# Patient Record
Sex: FEMALE | Race: WHITE | Employment: PART TIME | URBAN - METROPOLITAN AREA
[De-identification: names, ages, dates, MRNs, and addresses within clinical notes are randomized per-mention and may not be internally consistent; named-entity substitution may affect disease eponyms.]

---

## 2022-03-18 ENCOUNTER — APPOINTMENT (OUTPATIENT)
Dept: GENERAL RADIOLOGY | Age: 37
End: 2022-03-18
Attending: NURSE PRACTITIONER
Payer: OTHER GOVERNMENT

## 2022-03-18 ENCOUNTER — HOSPITAL ENCOUNTER (EMERGENCY)
Age: 37
Discharge: HOME | End: 2022-03-18
Attending: EMERGENCY MEDICINE
Payer: OTHER GOVERNMENT

## 2022-03-18 VITALS
DIASTOLIC BLOOD PRESSURE: 77 MMHG | WEIGHT: 160 LBS | TEMPERATURE: 98.8 F | SYSTOLIC BLOOD PRESSURE: 125 MMHG | RESPIRATION RATE: 18 BRPM | HEIGHT: 64 IN | HEART RATE: 76 BPM | OXYGEN SATURATION: 100 % | BODY MASS INDEX: 27.31 KG/M2

## 2022-03-18 DIAGNOSIS — S70.11XA HEMATOMA OF RIGHT THIGH, INITIAL ENCOUNTER: Primary | ICD-10-CM
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
## 2022-03-18 PROCEDURE — 73552 X-RAY EXAM OF FEMUR 2/>: CPT

## 2022-03-18 PROCEDURE — 73502 X-RAY EXAM HIP UNI 2-3 VIEWS: CPT

## 2022-03-18 PROCEDURE — 99283 EMERGENCY DEPT VISIT LOW MDM: CPT

## 2022-03-18 PROCEDURE — 74011250637 HC RX REV CODE- 250/637: Performed by: NURSE PRACTITIONER

## 2022-03-18 RX ORDER — NAPROXEN 500 MG/1
500 TABLET ORAL
Qty: 20 TABLET | Refills: 0 | Status: SHIPPED | OUTPATIENT
Start: 2022-03-18 | End: 2022-03-28

## 2022-03-18 RX ORDER — HYDROCODONE BITARTRATE AND ACETAMINOPHEN 5; 325 MG/1; MG/1
1 TABLET ORAL
Status: COMPLETED | OUTPATIENT
Start: 2022-03-18 | End: 2022-03-18

## 2022-03-18 RX ORDER — METHOCARBAMOL 750 MG/1
1500 TABLET, FILM COATED ORAL
Qty: 30 TABLET | Refills: 0 | Status: SHIPPED | OUTPATIENT
Start: 2022-03-18

## 2022-03-18 RX ADMIN — HYDROCODONE BITARTRATE AND ACETAMINOPHEN 1 TABLET: 5; 325 TABLET ORAL at 14:53

## 2022-03-18 NOTE — Clinical Note
Anila Baker was seen and treated in our emergency department on 3/18/2022. Please excuse patient's , Barbara Mcknight as he accompanied her in the ER after a traumatic injury.      CHATA Timmons

## 2022-03-18 NOTE — Clinical Note
[address] [place] [address] Dr [facility] EMERGENCY DEPT  [address] [address] [phone]    Work/School Note    Date: 3/18/2022    To Whom It May concern:      Davy Mcneill was seen and treated today in the emergency room by the following provider(s):  Attending Provider: Baudilio Pruitt DO  Nurse Practitioner: CHATA Trivedi. Davy Mcneill is excused from work/school on 03/18/22. She is clear to return to work/school on 03/19/22.         Sincerely,          CHATA Suero

## 2022-03-18 NOTE — ED TRIAGE NOTES
Pt brought in via medic for fall. Pt was in loft in her shed and fell off the ladder.  CC Right thigh pain radiating to knee

## 2022-03-18 NOTE — Clinical Note
Ruma Castañeda was seen and treated in our emergency department on 3/18/2022. Please excuse patient's , Lamberto Gibson as he accompanied her in the ER after a traumatic injury.      CHATA Carlin

## 2022-03-18 NOTE — ED PROVIDER NOTES
EMERGENCY DEPARTMENT HISTORY AND PHYSICAL EXAM    Date: 3/18/2022  Patient Name: Rosa Buckley    History of Presenting Illness     Chief Complaint   Patient presents with    Fall       History Provided By: patient      Additional History (Context): Rosa Buckley is a 65-year-old female who presents to the ER by EMS with complaints of right thigh pain after a fall off of a ladder from approximately 6 feet. Patient states she was up in a loft in her shed with her feet on the highest rung of the ladder when the ladder slipped from underneath her causing her to fall. She states she was able to touch her head and and did not strike her head or suffer any loss of consciousness. She also denies any neck or back pain. She was able to get up but she was unable to put any weight on the right leg due to pain in the posterior thigh. She denies any pain in the knee or ankle or the hip. No prior injury to the right lower extremity. No paresthesia. PCP: None        Past History     Past Medical History:  No past medical history on file. Past Surgical History:  No past surgical history on file. Family History:  No family history on file. Social History:  Social History     Tobacco Use    Smoking status: Not on file    Smokeless tobacco: Not on file   Substance Use Topics    Alcohol use: Not on file    Drug use: Not on file       Allergies: Allergies   Allergen Reactions    Latex Hives    Strawberry Anaphylaxis    Aspirin Other (comments)     Hyperreaction, pt states it \"over thins\" her blood.  Codeine Nausea and Vomiting         Review of Systems     Review of Systems   Constitutional: Negative for chills and fever. HENT: Negative for nasal congestion, sore throat, rhinorrhea  Eyes: Negative. Respiratory: Negative for cough and for shortness of breath. Cardiovascular: Negative for chest pain and palpitations.    Gastrointestinal: Negative for abdominal pain, constipation, diarrhea, nausea and vomiting. Genitourinary: Negative. Negative for difficulty urinating and flank pain. Musculoskeletal: Positive for right hip and thigh pain. Negative for back pain. Negative for gait problem and neck pain. Skin: Negative. Allergic/Immunologic: Negative. Neurological: Negative for dizziness, weakness, numbness and headaches. Psychiatric/Behavioral: Negative. All other systems reviewed and are negative. All Other Systems Negative    Physical Exam     Vitals:    03/18/22 1418   BP: 125/77   Pulse: 76   Resp: 18   Temp: 98.8 °F (37.1 °C)   SpO2: 100%   Weight: 72.6 kg (160 lb)   Height: 5' 4\" (1.626 m)     Physical Exam  Vitals and nursing note reviewed. Constitutional:       General: She is not in acute distress. Appearance: Normal appearance. She is not ill-appearing, toxic-appearing or diaphoretic. HENT:      Head: Normocephalic and atraumatic. Nose: Nose normal.      Mouth/Throat:      Mouth: Mucous membranes are moist.      Pharynx: Oropharynx is clear. Eyes:      General: Lids are normal. Vision grossly intact. Conjunctiva/sclera: Conjunctivae normal.   Cardiovascular:      Rate and Rhythm: Normal rate and regular rhythm. Pulses: Normal pulses. Heart sounds: Normal heart sounds. Pulmonary:      Effort: Pulmonary effort is normal. No respiratory distress. Breath sounds: Normal breath sounds. No stridor. No wheezing, rhonchi or rales. Chest:      Chest wall: No tenderness. Abdominal:      Palpations: Abdomen is soft. Tenderness: There is no abdominal tenderness. There is no right CVA tenderness, left CVA tenderness or guarding. Comments: No pain with deep palpation to the anterior costal margins. No ecchymosis or signs of injury to the anterior posterior thorax   Musculoskeletal:         General: Normal range of motion. Cervical back: Normal, full passive range of motion without pain, normal range of motion and neck supple. No tenderness. Thoracic back: Normal.      Lumbar back: Normal.      Right hip: Normal range of motion. Right upper leg: Tenderness and bony tenderness present. No swelling or deformity. Right knee: Normal.      Right ankle: Normal.      Right foot: Normal capillary refill. No tenderness. Normal pulse. Legs:       Comments: No midline spinal process tenderness, step-off, deformity to the cervical, thoracic, or lumbar spine   Lymphadenopathy:      Cervical: No cervical adenopathy. Skin:     General: Skin is warm and dry. Capillary Refill: Capillary refill takes less than 2 seconds. Findings: Bruising present. Neurological:      General: No focal deficit present. Mental Status: She is alert and oriented to person, place, and time. Psychiatric:         Mood and Affect: Mood normal.         Behavior: Behavior normal. Behavior is cooperative. Diagnostic Study Results     Labs -   No results found for this or any previous visit (from the past 12 hour(s)). Radiologic Studies -   XR HIP RT W OR WO PELV 2-3 VWS   Final Result      No radiographic finding for right hip fracture or right femur fracture. Radiodensity in the left abdomen could be an ingested foreign body, surgical   clip is something overlying the patient. XR FEMUR RT 2 VS   Final Result      No radiographic finding for right hip fracture or right femur fracture. Radiodensity in the left abdomen could be an ingested foreign body, surgical   clip is something overlying the patient. CT Results  (Last 48 hours)    None        CXR Results  (Last 48 hours)    None            Medical Decision Making   I am the first provider for this patient. I reviewed the vital signs, available nursing notes, past medical history, past surgical history, family history and social history. Vital Signs-Reviewed the patient's vital signs.         Records Reviewed: Nursing notes, old medical records and any previous labs, imaging, visits, consultations pertinent to patient care    Procedures:  Procedures    ED Course: Progress Notes, Reevaluation, and Consults:  2:21 PM  Initial assessment performed. The patients presenting problems have been discussed, and they/their family are in agreement with the care plan formulated and outlined with them. I have encouraged them to ask questions as they arise throughout their visit. 4:30 PM patient up ambulating with normal weightbearing and very slight limp to the right lower extremity. Feeling much better on reassessment. Pain control was achieved. The radiological findings were discussed in detail with the patient and family. X-ray negative for acute process per my interpretation of images. Discussed treatment of hematoma with ice. Appropriate for outpatient management. Will discuss supportive treatment, NSAIDS, RICE and PCP follow-up. Discussed treatment plan, return precautions, symptomatic relief, and expected time to improvement. All questions answered. Patient is stable for discharge and outpatient management. Medication use, risk/benefit, side effects, and precautions discussed. The patient was educated extensively on mandatory return criteria. The patient verbalized understanding of all instruction provided and agrees with the plan of care. Provider Notes (Medical Decision Making):     Differential diagnosis: Sprain/strain, dislocation, fracture    Patient is a 79-year-old female I EMS who presents with complaints of right posterior thigh pain and swelling after a fall approximately 6 feet from a ladder. She fell slowly after losing her balance and landed on top of the ladder. There was no head injury or loss of consciousness. No neck or back pain and no paresthesias she does have localized tenderness overlying a hematoma on the right posterior thigh. Full active range of motion at the hip and knee.  Due to the mechanism of injury a thorough physical exam was completed and appropriate diagnostic studies were ordered. MED RECONCILIATION:  No current facility-administered medications for this encounter. Current Outpatient Medications   Medication Sig    methocarbamoL (Robaxin-750) 750 mg tablet Take 2 Tablets by mouth nightly as needed for Muscle Spasm(s) or Pain.  naproxen (Naprosyn) 500 mg tablet Take 1 Tablet by mouth two (2) times daily as needed for Pain for up to 10 days. Disposition:  Home in stable condition    DISCHARGE NOTE:     Patient has been reexamined. Patient has no new complaints, changes, or physical findings. Care plan outlined and precautions discussed. Discussed proper way to take medications. Discussed treatment plan, return precautions, symptomatic relief, and expected time to improvement. All questions answered. Patient is stable for discharge and outpatient management. Patient is ready to go home. Follow-up Information     Follow up With Specialties Details Why Kenneth Contreras  Schedule an appointment as soon as possible for a visit  Follow-up from the Emergency Department [address] [place] [facility] EMERGENCY DEPT Emergency Medicine  As needed, If symptoms worsen [address]  [phone]          Discharge Medication List as of 3/18/2022  4:24 PM      START taking these medications    Details   methocarbamoL (Robaxin-750) 750 mg tablet Take 2 Tablets by mouth nightly as needed for Muscle Spasm(s) or Pain., Normal, Disp-30 Tablet, R-0      naproxen (Naprosyn) 500 mg tablet Take 1 Tablet by mouth two (2) times daily as needed for Pain for up to 10 days. , Normal, Disp-20 Tablet, R-0                   Diagnosis     Clinical Impression:   1. Hematoma of right thigh, initial encounter    2.  Fall from ladder, initial encounter          Dictation disclaimer:  Please note that this dictation was completed with dashawn Herrera computer voice recognition software. Quite often unanticipated grammatical, syntax, homophones, and other interpretive errors are inadvertently transcribed by the computer software. Please disregard these errors. Please excuse any errors that have escaped final proofreading.

## 2022-12-30 ENCOUNTER — TRANSCRIBE ORDER (OUTPATIENT)
Dept: REGISTRATION | Age: 37
End: 2022-12-30

## 2022-12-30 ENCOUNTER — HOSPITAL ENCOUNTER (OUTPATIENT)
Dept: GENERAL RADIOLOGY | Age: 37
Discharge: HOME | End: 2022-12-30
Payer: MEDICAID

## 2022-12-30 DIAGNOSIS — J32.9 SINUS INFECTION: Primary | ICD-10-CM

## 2022-12-30 DIAGNOSIS — J32.9 SINUS INFECTION: ICD-10-CM

## 2022-12-30 PROCEDURE — 70220 X-RAY EXAM OF SINUSES: CPT

## 2023-02-27 ENCOUNTER — OFFICE VISIT (OUTPATIENT)
Age: 38
End: 2023-02-27

## 2023-02-27 DIAGNOSIS — M25.571 CHRONIC PAIN OF RIGHT ANKLE: ICD-10-CM

## 2023-02-27 DIAGNOSIS — M67.471 GANGLION OF RIGHT ANKLE: ICD-10-CM

## 2023-02-27 DIAGNOSIS — R22.41 MASS OF RIGHT ANKLE: Primary | ICD-10-CM

## 2023-02-27 DIAGNOSIS — G89.29 CHRONIC PAIN OF RIGHT ANKLE: ICD-10-CM

## 2023-02-27 DIAGNOSIS — M67.471 GANGLION CYST OF RIGHT FOOT: ICD-10-CM

## 2023-02-27 NOTE — PROGRESS NOTES
AMBULATORY PROGRESS NOTE      Patient: Wendy Dietrich             MRN: [id number]     SSN: [id number] There is no height or weight on file to calculate BMI. YOB: 1985     AGE: 44 y.o. EX: female    PCP: No primary care provider on file. IMPRESSION //  DIAGNOSIS AND TREATMENT PLAN      Yocasta Gomes has a diagnosis of: It is my professional opinion that Wendy Dietrich has an Orthopaedic Diagnosis of      ICD-10-CM    1. Mass of right ankle  R22.41       2. Chronic pain of right ankle  M25.571 [69365] Ankle Min 3V    G89.29 MRI ANKLE RIGHT WO CONTRAST      3. Ganglion cyst of right foot  M67.471       4. Ganglion of right ankle  M67.471 MRI ANKLE RIGHT WO CONTRAST      . Wendy Dietrich has : Failed Conservative Treatment : Non Narcotic Analgesic Medications, ,  and Wendy Dietrich is now in need of, Advanced Imaging MRI RIGHT ANKLE in order to assess FIBULA MASS, and  AS THIS STUDY HAS PROGNOSTIC AND TREATMENT DECISION IMPLICATIONS. MASS HAS BEEN PRESENT FOR SEVERAL YEARS. DIAGNOSES    1. Mass of right ankle    2. Chronic pain of right ankle    3. Ganglion cyst of right foot    4. Ganglion of right ankle            PLAN:    1. I obtained 3V XR in the office today. 2. I discussed surgical and non surgical options with the patient. 3. I anticipate examining patient's MRI results from her PCP's office. 4. I will give the patient my 's (Reba) card. RTO after MRI    Orders Placed This Encounter    [[id number]] Ankle Min 3V     Order Specific Question:   Are you Pregnant? Answer:   No    MRI ANKLE RIGHT WO CONTRAST     Standing Status:   Future     Standing Expiration Date:   2/27/2024     Order Specific Question:   Reason for exam:     Answer:   mass fibula     Order Specific Question:   What is the sedation requirement? Answer:   None        There are no Patient Instructions on file for this visit.       Please follow up with your PCP for any health maintenance as recommended. Zeke Francisco  expresses understanding of the diagnosis, treatment plan, and all of their proposed questions were answered to their satisfaction. Patient education has been provided re the diagnoses. HPI //  Nagi Figueroae A 44 y.o. female who is a/an  new patient, presenting to my outpatient office for evaluation of  the following chief complaint(s):     Chief Complaint   Patient presents with    Ankle Pain     right       Zeke Francisco presents today with right ankle pain. She repots that she has a cyst on her ankle. She has had the cyst for the past 4-5 years, but since recently she started experiencing pain and discomfort. She explains that some days she experiences pain and swelling and some days she is fine. She reports that she does have popliteal symptoms, but no pain. C/O SHOOTING PAIN TO DISTAL RIGHT FIBULA REGION. Of note, Zeke Francisco  reports that she was seen her at her PCP office and had an ultrasound done. She states that she already has a knee scooter at her residence. She explains that her C5 was damaged due to going to a chiropractor and not having proper posture while laying on the bed. There were no vitals taken for this visit. Appearance: Alert, well appearing and pleasant patient who is in no distress, oriented to person, place/time, and who follows commands. Normal dress/motor activity/thought processes/memory. This patient is accompanied in the examination room by her self. Patient arrives to office via: without assistive device. Footwear: athletic sneakers    Psychiatric:  Normal Affect/mood. Judgement, behavior, and conduct are appropriate. Speech normal in context and clarity, memory intact grossly, no involuntary movements - tremors. H EENT (2): Head normocephalic & atraumatic.   Eye: pupils are round// EOM are intact // Neck: ROM WNL  // Hearings Intact  Respiratory: Breathing non labored     ANKLE/FOOT Right    Gait:  normal  Tenderness: no  tenderness at this time   Cutaneous:   2 CM X 2 CM fullness of right ankle at the distal fibula, fullness is slightly firm, non pulsatile  Joint Motion:  WNL   Joint / Tendon Stability:  No Ankle or Subtalar instability or joint laxity. No peroneal sublux ability or dislocation  Alignment: neutral Hindfoot,    Neuro Motor/Sensory: NL/NL  Vascular: NL foot/ankle pulses,   Lymphatics: No extremity lymphedema, No calf swelling, no tenderness to calf muscles. RADIOGRAPHS// IMAGING//DIAGNOSTIC DATA     Orders Placed This Encounter   Procedures    [55152] Ankle Min 3V     Order Specific Question:   Are you Pregnant? Answer:   No    MRI ANKLE RIGHT WO CONTRAST     Standing Status:   Future     Standing Expiration Date:   2/27/2024     Order Specific Question:   Reason for exam:     Answer:   mass fibula     Order Specific Question:   What is the sedation requirement? Answer:   None      ANKLE X RAYS 3 VIEWS Right   X RAYS AT [address] [place] [address]  2/27/2023    NON WEIGHT BEARING  3 VIEWS :AP/LATERAL/OBLIQUE     X RAYS AT [address] [place] [address]  2/27/2023    Bones: No fractures or dislocations. No focal osteolytic or osteoblastic process   Bone Spurs: No significant bone spurs  Alignment: Ankle mortise alignment is congruent, Tibial plafond and talar dome intact. No Osteochondral defects seen   Joint: No Significant OA changes present  Soft Tissues: Normal, No radiopaque foreign body            No abnormal calcific densities to soft tissues            No ankle joint effusion in lateral projection. Mineralization: Suggests no Osteopenia    I have personally reviewed the results of the above study. The interpretation of this study is my professional opinion               CHART REVIEW     [address] [place] [address] has been experiencing pain and discomfort confirmed as outlined in the pain assessment outlined below.  was reviewed by Rod Baig.  Salvador House MD on 2/27/2023. No flowsheet data found. PAST MEDICAL HISTORY: History reviewed. No pertinent past medical history. PAST SURGICAL HISTORY: History reviewed. No pertinent surgical history. ALLERGIES:   Allergies   Allergen Reactions    Latex Hives    Strawberry Anaphylaxis    Aspirin Other (See Comments)     Hyperreaction, pt states it \"over thins\" her blood. Codeine Nausea And Vomiting      FAMILY HISTORY: History reviewed. No pertinent family history. SOCIAL HISTORY:   Social History     Socioeconomic History    Marital status:      Spouse name: None    Number of children: None    Years of education: None    Highest education level: None   Tobacco Use    Smoking status: Unknown       CURRENT MEDICATIONS:  A list of medications prior to the time of admission include:  Prior to Admission medications    Medication Sig Start Date End Date Taking? Authorizing Provider   methocarbamol (ROBAXIN) 750 MG tablet Take 1,500 mg by mouth 3/18/22  Yes Ar Automatic Reconciliation       Current Outpatient Medications   Medication Sig    methocarbamol (ROBAXIN) 750 MG tablet Take 1,500 mg by mouth     No current facility-administered medications for this visit. DIAGNOSTIC LAB DATA      XR SINUSES (MIN 3 VIEWS ) 12/30/2022    Narrative  EXAMINATION: Paranasal sinuses 4 views    INDICATION: Sinusitis    COMPARISON: None    FINDINGS: 4 views of the paranasal sinuses obtained. Asymmetric hazy density  projecting in the left maxillary sinus. No other significant incidental  findings. Impression  Suspected left maxillary sinus disease. Of note, CT would be more sensitive for  identifying paranasal sinus disease. No results found for this or any previous visit (from the past 4464 hour(s)).       No results found for: WBC, HGB, HCT, MCV, PLT, LABLYMP, MID, GRAN, LYMPHOPCT, MIDPERCENT, GRANULOCYTES, RBC, MCH, MCHC, RDW  No results found for: LABA1C,  No results found for: NA, K, CL, CO2, BUN, CREATININE, GLUCOSE, CALCIUM, PROT, LABALBU, BILITOT, ALKPHOS, AST, ALT, LABGLOM, GFRAA, AGRATIO, GLOB  No results found for: VITD25   No results found for: INR, PROTIME  No results found for: APTT       REVIEW OF SYSTEMS : 2/27/2023  ALL BELOW ARE Negative except : SEE HPI     All other systems reviewed and are negative. 14 point review of systems otherwise negative unless noted in HPI. I have spent 30 minutes reviewing the previous notes, reviewing diagnostic studies [Advanced  Imaging, Diagnostic test results (x-rays)] and had a direct face to face with the patient discussing the diagnosis and importance of compliance with the treatment and plan. The treatment plan is listed in the above plan section of this Office encounter. I  answered all of her questions, as well as documenting patient care coordination for this individual on the day of the visit. Disclaimer:     Sections of this note are dictated using utilizing voice recognition software, which may have resulted in some phonetic based errors in grammar and contents. Even though attempts were made to correct all the mistakes, some may have been missed, and remained in the body of the document. If questions arise, please contact our department. An electronic signature was used to authenticate this note. Candy Vazquez may have a reminder for a \"due or due soon\" health maintenance. I have asked that she contact her primary care provider for follow-up on this health maintenance.            Scribed by Pan Suazo (Chelsey Chamberlain), as dictated by Yumiko Baer MD   2/27/2023  11:23 AM

## 2023-03-24 ENCOUNTER — HOSPITAL ENCOUNTER (OUTPATIENT)
Facility: HOSPITAL | Age: 38
End: 2023-03-24
Payer: MEDICARE

## 2023-03-24 DIAGNOSIS — M67.471 GANGLION OF RIGHT ANKLE: ICD-10-CM

## 2023-03-24 DIAGNOSIS — M25.571 CHRONIC PAIN OF RIGHT ANKLE: ICD-10-CM

## 2023-03-24 DIAGNOSIS — G89.29 CHRONIC PAIN OF RIGHT ANKLE: ICD-10-CM

## 2023-03-24 PROCEDURE — 73721 MRI JNT OF LWR EXTRE W/O DYE: CPT

## 2023-03-27 ENCOUNTER — HOSPITAL ENCOUNTER (OUTPATIENT)
Facility: HOSPITAL | Age: 38
Discharge: HOME | End: 2023-03-30
Payer: OTHER GOVERNMENT

## 2023-03-27 DIAGNOSIS — Z91.018 ALLERGY, FOOD: ICD-10-CM

## 2023-03-27 DIAGNOSIS — H10.44: ICD-10-CM

## 2023-03-27 PROCEDURE — 86003 ALLG SPEC IGE CRUDE XTRC EA: CPT

## 2023-03-27 PROCEDURE — 36415 COLL VENOUS BLD VENIPUNCTURE: CPT

## 2023-03-27 PROCEDURE — 86225 DNA ANTIBODY NATIVE: CPT

## 2023-03-27 PROCEDURE — 86038 ANTINUCLEAR ANTIBODIES: CPT

## 2023-03-27 PROCEDURE — 83520 IMMUNOASSAY QUANT NOS NONAB: CPT

## 2023-03-28 LAB
ANA SER QL: POSITIVE
CENTROMERE B AB SER-ACNC: <0.2 AI (ref 0–0.9)
CHROMATIN AB SERPL-ACNC: <0.2 AI (ref 0–0.9)
DSDNA AB SER-ACNC: <1 IU/ML (ref 0–9)
ENA JO1 AB SER-ACNC: <0.2 AI (ref 0–0.9)
ENA RNP AB SER-ACNC: 0.3 AI (ref 0–0.9)
ENA SCL70 AB SER-ACNC: <0.2 AI (ref 0–0.9)
ENA SM AB SER-ACNC: <0.2 AI (ref 0–0.9)
ENA SS-A AB SER-ACNC: 4.8 AI (ref 0–0.9)
ENA SS-B AB SER-ACNC: <0.2 AI (ref 0–0.9)
Lab: ABNORMAL
TRYPTASE SERPL-MCNC: 20.9 UG/L (ref 2.2–13.2)

## 2023-03-30 LAB
COW MILK IGE QN: <0.1 KU/L
EGG WHITE IGE QN: <0.1 KU/L
LTX IGE QN: <0.1 KU/L
SOYBEAN IGE QN: <0.1 KU/L
STRAWBERRY IGE QN: <0.1 KU/L

## 2023-04-17 ENCOUNTER — OFFICE VISIT (OUTPATIENT)
Age: 38
End: 2023-04-17

## 2023-04-17 VITALS — BODY MASS INDEX: 25.1 KG/M2 | HEIGHT: 64 IN | WEIGHT: 147 LBS

## 2023-04-17 DIAGNOSIS — R22.41 MASS OF RIGHT ANKLE: Primary | ICD-10-CM

## 2023-04-17 NOTE — PROGRESS NOTES
problematic for her at at times. 2.      RTO I will talk to my surgery scheduler, Reba, who will contact this patient, to get scheduled to remove this fullness, mass to the distal lateral portion of her ankle overlying the distal fibula    Orders Placed This Encounter    SCHEDULE SURGERY     PATIENT NAME: [id number]: PR[address] :Elective  LENGTH OF PROCEDURE: 1.25 HOURS  ASSISTANT:  Cathy IRBY , yes please  PROCEDURE DATE: TBD  ADMIT: Outpatient  (R22.41) Mass of right ankle  (primary encounter diagnosis)    PROCEDURE: Excision mass  right [id number]     ANESTHESIA: general anesthesia and regional anesthesia    EQUIPMENT: 15 AND 64 Zuni blade       SURGERY REP:  NO    PHONE NUMBER:       LABS PREOP  No orders of the defined types were placed in this encounter. CLEARANCES:    Is Patient on Blood Thinners?: No:       [unfilled]    XR Chest PA and Lateral     Standing Status:   Future     Standing Expiration Date:   4/17/2024    Vitamin D 25 Hydroxy     Standing Status:   Future     Standing Expiration Date:   4/17/2024    Comprehensive Metabolic Panel     Standing Status:   Future     Standing Expiration Date:   4/17/2024    CBC with Auto Differential     Standing Status:   Future     Standing Expiration Date:   4/17/2024    Protime-INR     Standing Status:   Future     Standing Expiration Date:   4/17/2024     Order Specific Question:   Daily Coumadin Dose? Answer:   0    EKG 12 Lead     Standing Status:   Future     Standing Expiration Date:   4/17/2024     Order Specific Question:   Reason for Exam?     Answer:   Pre-op        There are no Patient Instructions on file for this visit. Please follow up with your PCP for any health maintenance as recommended. Phil Cheema  expresses understanding of the diagnosis, treatment plan, and all of their proposed questions were answered to their satisfaction.  Patient education has

## 2023-04-24 ENCOUNTER — HOSPITAL ENCOUNTER (OUTPATIENT)
Facility: HOSPITAL | Age: 38
Discharge: HOME | End: 2023-04-27
Payer: OTHER GOVERNMENT

## 2023-04-24 DIAGNOSIS — R22.41 MASS OF RIGHT ANKLE: ICD-10-CM

## 2023-04-24 LAB
25(OH)D3 SERPL-MCNC: 28.3 NG/ML (ref 30–100)
ALBUMIN SERPL-MCNC: 4 G/DL (ref 3.4–5)
ALBUMIN/GLOB SERPL: 1.2 (ref 0.8–1.7)
ALP SERPL-CCNC: 63 U/L (ref 45–117)
ALT SERPL-CCNC: 21 U/L (ref 13–56)
ANION GAP SERPL CALC-SCNC: 3 MMOL/L (ref 3–18)
AST SERPL-CCNC: 18 U/L (ref 10–38)
BASOPHILS # BLD: 0 K/UL (ref 0–0.1)
BASOPHILS NFR BLD: 1 % (ref 0–2)
BILIRUB SERPL-MCNC: 0.5 MG/DL (ref 0.2–1)
BUN SERPL-MCNC: 12 MG/DL (ref 7–18)
BUN/CREAT SERPL: 14 (ref 12–20)
CALCIUM SERPL-MCNC: 9.4 MG/DL (ref 8.5–10.1)
CHLORIDE SERPL-SCNC: 108 MMOL/L (ref 100–111)
CO2 SERPL-SCNC: 29 MMOL/L (ref 21–32)
CREAT SERPL-MCNC: 0.83 MG/DL (ref 0.6–1.3)
DIFFERENTIAL METHOD BLD: NORMAL
EKG ATRIAL RATE: 63 BPM
EKG DIAGNOSIS: NORMAL
EKG P AXIS: 60 DEGREES
EKG P-R INTERVAL: 136 MS
EKG Q-T INTERVAL: 404 MS
EKG QRS DURATION: 84 MS
EKG QTC CALCULATION (BAZETT): 413 MS
EKG R AXIS: 31 DEGREES
EKG T AXIS: 33 DEGREES
EKG VENTRICULAR RATE: 63 BPM
EOSINOPHIL # BLD: 0.3 K/UL (ref 0–0.4)
EOSINOPHIL NFR BLD: 4 % (ref 0–5)
ERYTHROCYTE [DISTWIDTH] IN BLOOD BY AUTOMATED COUNT: 11.8 % (ref 11.6–14.5)
GLOBULIN SER CALC-MCNC: 3.4 G/DL (ref 2–4)
GLUCOSE SERPL-MCNC: 102 MG/DL (ref 74–99)
HCT VFR BLD AUTO: 42.2 % (ref 35–45)
HGB BLD-MCNC: 13.8 G/DL (ref 12–16)
IMM GRANULOCYTES # BLD AUTO: 0 K/UL (ref 0–0.04)
IMM GRANULOCYTES NFR BLD AUTO: 0 % (ref 0–0.5)
INR PPP: 1 (ref 0.8–1.2)
LYMPHOCYTES # BLD: 2.2 K/UL (ref 0.9–3.6)
LYMPHOCYTES NFR BLD: 36 % (ref 21–52)
MCH RBC QN AUTO: 30.1 PG (ref 24–34)
MCHC RBC AUTO-ENTMCNC: 32.7 G/DL (ref 31–37)
MCV RBC AUTO: 91.9 FL (ref 78–100)
MONOCYTES # BLD: 0.6 K/UL (ref 0.05–1.2)
MONOCYTES NFR BLD: 10 % (ref 3–10)
NEUTS SEG # BLD: 3.1 K/UL (ref 1.8–8)
NEUTS SEG NFR BLD: 50 % (ref 40–73)
NRBC # BLD: 0 K/UL (ref 0–0.01)
NRBC BLD-RTO: 0 PER 100 WBC
PLATELET # BLD AUTO: 160 K/UL (ref 135–420)
PMV BLD AUTO: 10.2 FL (ref 9.2–11.8)
POTASSIUM SERPL-SCNC: 4.2 MMOL/L (ref 3.5–5.5)
PROT SERPL-MCNC: 7.4 G/DL (ref 6.4–8.2)
PROTHROMBIN TIME: 13.5 SEC (ref 11.5–15.2)
RBC # BLD AUTO: 4.59 M/UL (ref 4.2–5.3)
SODIUM SERPL-SCNC: 140 MMOL/L (ref 136–145)
WBC # BLD AUTO: 6.3 K/UL (ref 4.6–13.2)

## 2023-04-24 PROCEDURE — 36415 COLL VENOUS BLD VENIPUNCTURE: CPT

## 2023-04-24 PROCEDURE — 85610 PROTHROMBIN TIME: CPT

## 2023-04-24 PROCEDURE — 93010 ELECTROCARDIOGRAM REPORT: CPT | Performed by: INTERNAL MEDICINE

## 2023-04-24 PROCEDURE — 71046 X-RAY EXAM CHEST 2 VIEWS: CPT

## 2023-04-24 PROCEDURE — 93005 ELECTROCARDIOGRAM TRACING: CPT

## 2023-04-24 PROCEDURE — 85025 COMPLETE CBC W/AUTO DIFF WBC: CPT

## 2023-04-24 PROCEDURE — 82306 VITAMIN D 25 HYDROXY: CPT

## 2023-04-24 PROCEDURE — 80053 COMPREHEN METABOLIC PANEL: CPT

## 2023-04-25 ENCOUNTER — PREP FOR PROCEDURE (OUTPATIENT)
Age: 38
End: 2023-04-25

## 2023-04-25 DIAGNOSIS — R22.41 MASS OF RIGHT ANKLE: Primary | ICD-10-CM

## 2023-04-25 RX ORDER — HYDROCODONE BITARTRATE AND ACETAMINOPHEN 5; 325 MG/1; MG/1
2 TABLET ORAL EVERY 6 HOURS PRN
Status: CANCELLED | OUTPATIENT
Start: 2023-04-25

## 2023-04-25 RX ORDER — CEPHALEXIN 250 MG/1
500 CAPSULE ORAL EVERY 8 HOURS SCHEDULED
Status: CANCELLED | OUTPATIENT
Start: 2023-04-25 | End: 2023-05-02

## 2023-04-25 RX ORDER — ONDANSETRON 4 MG/1
4 TABLET, ORALLY DISINTEGRATING ORAL EVERY 8 HOURS PRN
Status: CANCELLED | OUTPATIENT
Start: 2023-04-25

## 2023-04-25 NOTE — H&P
DISCHARGE SUMMARY    Johan Laureano  1974  [id number]    Admit date: 10/18/2022    Discharge date and time: 10/19/22     Admitting Physician: Darryl Patel MD    Discharge Physician: Darryl Patel MD    Admission Diagnoses: Bladder diverticulum, ectopic ureter within the diverticulum, recurring urinary tract infections, desired sterilization     Discharge Diagnoses: Same    Admission Condition: Good    Discharged Condition: Good    Indication for Admission: Post op recovery, pain control    Hospital Course: Patient underwent a robotic assisted laparoscopic bladder diverticulectomy, left distal ureterectomy, left ureteral reimplantation, bilateral vasectomy, and right ureteral stent placement. Patient tolerated the procedure well.  KEYUR drain was removed on POD#1.   Patient was afebrile, comfortable, tolerating a diet and passing flatus on the day of discharge.    Consults: None    Discharge Exam:  Patient is comfortable in no acute distress.  Abdomen soft, nontender, no guarding. Respirations unlabored, no cough.  Abdominal incisions clean, dry, intact.  Urine clear light pink in catheter tubing.    Disposition: Home    Patient Instructions:   Activity:  · Walk regularly, avoid prolonged sitting  · No jogging, running or other strenuous exercise for up to 6 weeks after surgery. Stairs should not be a problem but take them slowly at first. Rest as needed.   · Avoid driving for at least 1-2 weeks after surgery or until you are not taking pain medications or are pain free.  · Avoid sexual intercourse for 6 weeks unless otherwise indicated by your doctor    Diet:  • Advance to regular diet as tolerated, no restrictions  • You may want to increase oral fluids & fiber to avoid constipation.    Wound Care:  · Gently wash the incision with soap and water, rinse well, and pat dry  · OK to shower 48 hours after surgery, but do not take a tub bath until the incisions are completely healed and catheter removed  · Monitor  FOOT AND ANKLE HISTORY AND PHYSICAL      Patient: Bethel Metzger                   MRN: [id number]         SSN: [id number]  YOB: 1985            AGE: 44 y.o. SEX: female    Patient scheduled for:   MASS EXCISION OF RIGHT ANKLE; C-ARM; 15 AND 64 [place] BLADE; REGIONAL BLOCK - Right  General   Date of surgery: 4/27/2023   Surgical Time: 60 minutes  Consults: None needed  Special Equipment: 15 blade  Location of Surgery: [facility]  Surgeon: Rober Jett. MD Reji  ANESTHESIA TYPE:  General,   Popliteal block        PRESCRIPTIONS AND/OR ORDERS PROVIDED DURING H&P:    Orders Placed This Encounter    NONFORMULARY     Sig: Lion guicho mushroom-takes for ADHD/mood per pt    NONFORMULARY     Sig: Po amino acids    NONFORMULARY     Sig: Saffron flower extract      Keflex antibiotics, Norco prescription, Zofran prescription, sent to [facility], on "[employer] portion of the [facility] by me today 4/25/2023 2:06 PM      There are no outpatient Patient Instructions on file for this admission. HISTORY AND INFORMED CONSENT      The patient was seen in the office today for a preoperative history and physical for an upcoming above listed surgery. The patient is a pleasant 44 y.o. female who has a history of is here for: Persistent pain and firm fullness to the distal portion of her right fibula. Despite conservative care, active modification anti-inflammatorieS, shoewear changes, she still having disabling pain to the right lateral hindfoot. She had an MRI done March 24, 2023, of the right ankle without contrast.  The overall findings revealed no discrete ganglion cyst detected. There is a ill-defined subcutaneous signal abnormality overlying the lateral fibula, lateral malleolus which is felt to reflect possible adventitial bursitis or an inflammatory process. Small foci of susceptibility artifact, Flo Mendez also present in this region may reflect retained metallic debris\". incisions for redness, increased swelling, purulent drainage, if you notice any of these please call the clinic  · Intermittent blood in urine is expected post operatively    Medications:  · Take the prescribed Cipro antibiotic starting immediately after your echevarria catheter is removed in 10 days.  ·  If pain medication is prescribed upon discharge, take as instructed.  Do not drive any motorized vehicle, or sign any legal documents while you are taking narcotic pain medications. This type of medication is known to cause constipation and may also contain Acetaminophen.  · OK to take acetaminophen for less severe however do not take acetaminophen if taking a medication that is already combined with such. Do not exceed the maximum daily dosage of acetaminophen which is 4g/day from all sources.  · Take stool softener daily to promote regular soft bowel movements and avoid constipation. Avoid straining with bowel movements    Follow up:  · Follow-up with Dr. Patel in 10 days for post op check and catheter removal, the clinic will contact you to schedule such however if you do not hear from them by early next week please call [phone] to arrange your follow up.  · You will also have a cystogram the day of your follow-up prior to catheter removal.  · Call clinic with any questions or concerns [phone]    Reasons to Call the Physician  Your incision becomes red or swollen  The skin around your incision is warmer than elsewhere and is slightly red  There is drainage from your incision  There is an opening in your incision  You are having difficulty passing urine or your urine output becomes less than it  normally has been  There are chills or fever of 101 or more  Severe pain that is not relieved by pain medication      Agustina Reynaga NP  [facility]  Office Phone: [phone]  Pager: [phone]

## 2023-04-26 ENCOUNTER — ANESTHESIA EVENT (OUTPATIENT)
Facility: HOSPITAL | Age: 38
End: 2023-04-26
Payer: OTHER GOVERNMENT

## 2023-04-26 NOTE — DISCHARGE INSTRUCTIONS
ORTHOPAEDIC DISCHARGE PLAN     1. DISCHARGE DISPOSITION:  Home    2. FOLLOW UP RETURN TO OFFICE: 1 weeks    Call [phone] to confirm your appointment to see Dr. Francisco Fry. Leslee Ingram MD.   Follow up with Primary Care Provider in 7 to 10 days. 3. WEIGHT BEARING STATUS/ROM:    NO WEIGHT BEARING TO   to the Right LOWER EXTREMITY    4. ELEVATE the Right lower extremity on 1 pillow. Place the pillow horizontal so that no pressure is on the back of your heel    Please leave your current dressings and in place. Keep your dressings clean and dry to the: Right lower extremity      Please call [phone] ([facility]) if any: fever, shakes, chills, intractable pain, or for any questions you have regarding your care/medical condition   1. If you experience any calf pain or swelling, or are having any shortness of  breath, chest pain, or extremity swelling, or bleeding thru any surgical dressings,  or Bleeding at any body location while you are taking on any blood thinners. ie (mouth,nose, skin sites:)   2. Please go to closest ER  [facility] for assessment to rule out a leg clot and to  assess any  bleeding. 3. After general anesthesia or intravenous sedation, for 24 hours or while taking  prescription Narcotics:      [x]  Limit your activities     [x]   Do not drive and operate hazardous machinery    [x]   Do not make important personal or business decisions    [x]   Do  not drink alcoholic beverages    [x]  If you have not urinated within 8 hours after discharge, please contact    your surgeon on call.      Report the following to your surgeon: If any below is true         [x]   Excessive pain, swelling, redness or odor of or around the surgical area       [x]   Temperature over 100.5       [x]  Nausea and vomiting lasting longer than 4 hours or if unable to take medications       [x]    Any signs of decreased circulation or nerve impairment to extremity:    Change in color, persistent  numbness,

## 2023-04-27 ENCOUNTER — ANESTHESIA (OUTPATIENT)
Facility: HOSPITAL | Age: 38
End: 2023-04-27
Payer: OTHER GOVERNMENT

## 2023-04-27 ENCOUNTER — HOSPITAL ENCOUNTER (OUTPATIENT)
Facility: HOSPITAL | Age: 38
Setting detail: OUTPATIENT SURGERY
Discharge: HOME | End: 2023-04-27
Attending: ORTHOPAEDIC SURGERY | Admitting: ORTHOPAEDIC SURGERY
Payer: OTHER GOVERNMENT

## 2023-04-27 VITALS
BODY MASS INDEX: 27.26 KG/M2 | HEART RATE: 64 BPM | SYSTOLIC BLOOD PRESSURE: 99 MMHG | RESPIRATION RATE: 16 BRPM | WEIGHT: 158.8 LBS | DIASTOLIC BLOOD PRESSURE: 63 MMHG | TEMPERATURE: 97.6 F | OXYGEN SATURATION: 100 %

## 2023-04-27 DIAGNOSIS — R22.41 MASS OF RIGHT ANKLE: Primary | ICD-10-CM

## 2023-04-27 LAB — HCG UR QL: NEGATIVE

## 2023-04-27 PROCEDURE — 6370000000 HC RX 637 (ALT 250 FOR IP): Performed by: NURSE ANESTHETIST, CERTIFIED REGISTERED

## 2023-04-27 PROCEDURE — 2580000003 HC RX 258: Performed by: NURSE ANESTHETIST, CERTIFIED REGISTERED

## 2023-04-27 PROCEDURE — 7100000000 HC PACU RECOVERY - FIRST 15 MIN: Performed by: ORTHOPAEDIC SURGERY

## 2023-04-27 PROCEDURE — 6360000002 HC RX W HCPCS: Performed by: ORTHOPAEDIC SURGERY

## 2023-04-27 PROCEDURE — 3700000001 HC ADD 15 MINUTES (ANESTHESIA): Performed by: ORTHOPAEDIC SURGERY

## 2023-04-27 PROCEDURE — 3700000000 HC ANESTHESIA ATTENDED CARE: Performed by: ORTHOPAEDIC SURGERY

## 2023-04-27 PROCEDURE — 2580000003 HC RX 258: Performed by: ORTHOPAEDIC SURGERY

## 2023-04-27 PROCEDURE — 27618 EXC LEG/ANKLE TUM < 3 CM: CPT | Performed by: ORTHOPAEDIC SURGERY

## 2023-04-27 PROCEDURE — 81025 URINE PREGNANCY TEST: CPT

## 2023-04-27 PROCEDURE — 6360000002 HC RX W HCPCS: Performed by: ANESTHESIOLOGY

## 2023-04-27 PROCEDURE — 88305 TISSUE EXAM BY PATHOLOGIST: CPT

## 2023-04-27 PROCEDURE — 2709999900 HC NON-CHARGEABLE SUPPLY: Performed by: ORTHOPAEDIC SURGERY

## 2023-04-27 PROCEDURE — 2500000003 HC RX 250 WO HCPCS: Performed by: ORTHOPAEDIC SURGERY

## 2023-04-27 PROCEDURE — 7100000011 HC PHASE II RECOVERY - ADDTL 15 MIN: Performed by: ORTHOPAEDIC SURGERY

## 2023-04-27 PROCEDURE — 3600000012 HC SURGERY LEVEL 2 ADDTL 15MIN: Performed by: ORTHOPAEDIC SURGERY

## 2023-04-27 PROCEDURE — 2500000003 HC RX 250 WO HCPCS: Performed by: ANESTHESIOLOGY

## 2023-04-27 PROCEDURE — 7100000010 HC PHASE II RECOVERY - FIRST 15 MIN: Performed by: ORTHOPAEDIC SURGERY

## 2023-04-27 PROCEDURE — 7100000001 HC PACU RECOVERY - ADDTL 15 MIN: Performed by: ORTHOPAEDIC SURGERY

## 2023-04-27 PROCEDURE — 3600000002 HC SURGERY LEVEL 2 BASE: Performed by: ORTHOPAEDIC SURGERY

## 2023-04-27 RX ORDER — MIDAZOLAM HYDROCHLORIDE 2 MG/2ML
2 INJECTION, SOLUTION INTRAMUSCULAR; INTRAVENOUS
Status: DISCONTINUED | OUTPATIENT
Start: 2023-04-27 | End: 2023-04-27 | Stop reason: HOSPADM

## 2023-04-27 RX ORDER — HYDROCODONE BITARTRATE AND ACETAMINOPHEN 5; 325 MG/1; MG/1
2 TABLET ORAL EVERY 6 HOURS PRN
Status: DISCONTINUED | OUTPATIENT
Start: 2023-04-27 | End: 2023-04-27 | Stop reason: HOSPADM

## 2023-04-27 RX ORDER — ONDANSETRON 2 MG/ML
INJECTION INTRAMUSCULAR; INTRAVENOUS PRN
Status: DISCONTINUED | OUTPATIENT
Start: 2023-04-27 | End: 2023-04-27 | Stop reason: SDUPTHER

## 2023-04-27 RX ORDER — LORAZEPAM 2 MG/ML
0.5 INJECTION INTRAMUSCULAR
Status: DISCONTINUED | OUTPATIENT
Start: 2023-04-27 | End: 2023-04-27 | Stop reason: HOSPADM

## 2023-04-27 RX ORDER — HYDROCODONE BITARTRATE AND ACETAMINOPHEN 5; 325 MG/1; MG/1
1 TABLET ORAL EVERY 6 HOURS PRN
Qty: 28 TABLET | Refills: 0 | Status: SHIPPED | OUTPATIENT
Start: 2023-04-27 | End: 2023-05-04

## 2023-04-27 RX ORDER — FAMOTIDINE 20 MG/1
20 TABLET, FILM COATED ORAL ONCE
Status: COMPLETED | OUTPATIENT
Start: 2023-04-27 | End: 2023-04-27

## 2023-04-27 RX ORDER — SODIUM CHLORIDE 0.9 % (FLUSH) 0.9 %
5-40 SYRINGE (ML) INJECTION PRN
Status: DISCONTINUED | OUTPATIENT
Start: 2023-04-27 | End: 2023-04-27 | Stop reason: HOSPADM

## 2023-04-27 RX ORDER — MIDAZOLAM HYDROCHLORIDE 1 MG/ML
INJECTION INTRAMUSCULAR; INTRAVENOUS PRN
Status: DISCONTINUED | OUTPATIENT
Start: 2023-04-27 | End: 2023-04-27 | Stop reason: SDUPTHER

## 2023-04-27 RX ORDER — LIDOCAINE HYDROCHLORIDE 20 MG/ML
INJECTION, SOLUTION EPIDURAL; INFILTRATION; INTRACAUDAL; PERINEURAL PRN
Status: DISCONTINUED | OUTPATIENT
Start: 2023-04-27 | End: 2023-04-27 | Stop reason: SDUPTHER

## 2023-04-27 RX ORDER — ROPIVACAINE HYDROCHLORIDE 5 MG/ML
30 INJECTION, SOLUTION EPIDURAL; INFILTRATION; PERINEURAL ONCE
Status: DISCONTINUED | OUTPATIENT
Start: 2023-04-27 | End: 2023-04-27 | Stop reason: HOSPADM

## 2023-04-27 RX ORDER — ONDANSETRON 2 MG/ML
4 INJECTION INTRAMUSCULAR; INTRAVENOUS
Status: DISCONTINUED | OUTPATIENT
Start: 2023-04-27 | End: 2023-04-27 | Stop reason: HOSPADM

## 2023-04-27 RX ORDER — BUPIVACAINE HYDROCHLORIDE 5 MG/ML
INJECTION, SOLUTION EPIDURAL; INTRACAUDAL PRN
Status: DISCONTINUED | OUTPATIENT
Start: 2023-04-27 | End: 2023-04-27 | Stop reason: HOSPADM

## 2023-04-27 RX ORDER — LIDOCAINE HYDROCHLORIDE 10 MG/ML
1 INJECTION, SOLUTION EPIDURAL; INFILTRATION; INTRACAUDAL; PERINEURAL
Status: DISCONTINUED | OUTPATIENT
Start: 2023-04-27 | End: 2023-04-27 | Stop reason: HOSPADM

## 2023-04-27 RX ORDER — ONDANSETRON 4 MG/1
4 TABLET, FILM COATED ORAL EVERY 8 HOURS PRN
Status: DISCONTINUED | OUTPATIENT
Start: 2023-04-27 | End: 2023-04-27 | Stop reason: HOSPADM

## 2023-04-27 RX ORDER — PROPOFOL 10 MG/ML
INJECTION, EMULSION INTRAVENOUS PRN
Status: DISCONTINUED | OUTPATIENT
Start: 2023-04-27 | End: 2023-04-27 | Stop reason: SDUPTHER

## 2023-04-27 RX ORDER — HYDROCODONE BITARTRATE AND ACETAMINOPHEN 5; 325 MG/1; MG/1
1 TABLET ORAL EVERY 6 HOURS PRN
Status: DISCONTINUED | OUTPATIENT
Start: 2023-04-27 | End: 2023-04-27 | Stop reason: HOSPADM

## 2023-04-27 RX ORDER — DEXAMETHASONE SODIUM PHOSPHATE 4 MG/ML
INJECTION, SOLUTION INTRA-ARTICULAR; INTRALESIONAL; INTRAMUSCULAR; INTRAVENOUS; SOFT TISSUE PRN
Status: DISCONTINUED | OUTPATIENT
Start: 2023-04-27 | End: 2023-04-27 | Stop reason: SDUPTHER

## 2023-04-27 RX ORDER — PROCHLORPERAZINE EDISYLATE 5 MG/ML
5 INJECTION INTRAMUSCULAR; INTRAVENOUS
Status: DISCONTINUED | OUTPATIENT
Start: 2023-04-27 | End: 2023-04-27 | Stop reason: HOSPADM

## 2023-04-27 RX ORDER — HYDROCODONE BITARTRATE AND ACETAMINOPHEN 5; 325 MG/1; MG/1
1 TABLET ORAL EVERY 6 HOURS PRN
Qty: 28 TABLET | Refills: 0 | Status: SHIPPED | OUTPATIENT
Start: 2023-04-27 | End: 2023-04-27 | Stop reason: SDUPTHER

## 2023-04-27 RX ORDER — FENTANYL CITRATE 50 UG/ML
50 INJECTION, SOLUTION INTRAMUSCULAR; INTRAVENOUS EVERY 5 MIN PRN
Status: DISCONTINUED | OUTPATIENT
Start: 2023-04-27 | End: 2023-04-27 | Stop reason: HOSPADM

## 2023-04-27 RX ORDER — SODIUM CHLORIDE, SODIUM LACTATE, POTASSIUM CHLORIDE, CALCIUM CHLORIDE 600; 310; 30; 20 MG/100ML; MG/100ML; MG/100ML; MG/100ML
INJECTION, SOLUTION INTRAVENOUS CONTINUOUS
Status: DISCONTINUED | OUTPATIENT
Start: 2023-04-27 | End: 2023-04-27 | Stop reason: HOSPADM

## 2023-04-27 RX ORDER — SODIUM CHLORIDE 9 MG/ML
INJECTION, SOLUTION INTRAVENOUS PRN
Status: DISCONTINUED | OUTPATIENT
Start: 2023-04-27 | End: 2023-04-27 | Stop reason: HOSPADM

## 2023-04-27 RX ORDER — MEPERIDINE HYDROCHLORIDE 25 MG/ML
12.5 INJECTION INTRAMUSCULAR; INTRAVENOUS; SUBCUTANEOUS EVERY 5 MIN PRN
Status: DISCONTINUED | OUTPATIENT
Start: 2023-04-27 | End: 2023-04-27 | Stop reason: HOSPADM

## 2023-04-27 RX ORDER — FENTANYL CITRATE 50 UG/ML
INJECTION, SOLUTION INTRAMUSCULAR; INTRAVENOUS PRN
Status: DISCONTINUED | OUTPATIENT
Start: 2023-04-27 | End: 2023-04-27 | Stop reason: SDUPTHER

## 2023-04-27 RX ORDER — SODIUM CHLORIDE 0.9 % (FLUSH) 0.9 %
5-40 SYRINGE (ML) INJECTION EVERY 12 HOURS SCHEDULED
Status: DISCONTINUED | OUTPATIENT
Start: 2023-04-27 | End: 2023-04-27 | Stop reason: HOSPADM

## 2023-04-27 RX ORDER — CEPHALEXIN 250 MG/1
500 CAPSULE ORAL EVERY 8 HOURS SCHEDULED
Status: DISCONTINUED | OUTPATIENT
Start: 2023-04-27 | End: 2023-04-27 | Stop reason: HOSPADM

## 2023-04-27 RX ORDER — FENTANYL CITRATE 50 UG/ML
100 INJECTION, SOLUTION INTRAMUSCULAR; INTRAVENOUS
Status: DISCONTINUED | OUTPATIENT
Start: 2023-04-27 | End: 2023-04-27 | Stop reason: HOSPADM

## 2023-04-27 RX ADMIN — WATER 2000 MG: 1 INJECTION, SOLUTION INTRAMUSCULAR; INTRAVENOUS; SUBCUTANEOUS at 07:37

## 2023-04-27 RX ADMIN — MIDAZOLAM HYDROCHLORIDE 2 MG: 2 INJECTION, SOLUTION INTRAMUSCULAR; INTRAVENOUS at 07:22

## 2023-04-27 RX ADMIN — DEXAMETHASONE SODIUM PHOSPHATE 4 MG: 4 INJECTION, SOLUTION INTRAMUSCULAR; INTRAVENOUS at 07:44

## 2023-04-27 RX ADMIN — LIDOCAINE HYDROCHLORIDE 60 MG: 20 INJECTION, SOLUTION EPIDURAL; INFILTRATION; INTRACAUDAL; PERINEURAL at 07:26

## 2023-04-27 RX ADMIN — FAMOTIDINE 20 MG: 20 TABLET, FILM COATED ORAL at 06:51

## 2023-04-27 RX ADMIN — FENTANYL CITRATE 100 MCG: 50 INJECTION, SOLUTION INTRAMUSCULAR; INTRAVENOUS at 07:26

## 2023-04-27 RX ADMIN — PROPOFOL 120 MG: 10 INJECTION, EMULSION INTRAVENOUS at 07:26

## 2023-04-27 RX ADMIN — SODIUM CHLORIDE, POTASSIUM CHLORIDE, SODIUM LACTATE AND CALCIUM CHLORIDE: 600; 310; 30; 20 INJECTION, SOLUTION INTRAVENOUS at 06:51

## 2023-04-27 RX ADMIN — ONDANSETRON 4 MG: 2 INJECTION INTRAMUSCULAR; INTRAVENOUS at 07:57

## 2023-04-27 ASSESSMENT — PAIN DESCRIPTION - DESCRIPTORS
DESCRIPTORS: ACHING;NAGGING
DESCRIPTORS: ACHING;SORE
DESCRIPTORS: ACHING;SORE

## 2023-04-27 ASSESSMENT — PAIN - FUNCTIONAL ASSESSMENT: PAIN_FUNCTIONAL_ASSESSMENT: 0-10

## 2023-04-27 ASSESSMENT — PAIN DESCRIPTION - PAIN TYPE
TYPE: SURGICAL PAIN
TYPE: SURGICAL PAIN

## 2023-04-27 ASSESSMENT — PAIN DESCRIPTION - LOCATION
LOCATION: ANKLE
LOCATION: ANKLE

## 2023-04-27 ASSESSMENT — PAIN SCALES - GENERAL
PAINLEVEL_OUTOF10: 2
PAINLEVEL_OUTOF10: 2

## 2023-04-27 ASSESSMENT — PAIN DESCRIPTION - ORIENTATION
ORIENTATION: RIGHT
ORIENTATION: RIGHT

## 2023-04-27 NOTE — ANESTHESIA POSTPROCEDURE EVALUATION
Department of Anesthesiology  Postprocedure Note    Patient: Brandi Ann  MRN: [id number]  YOB: 1985  Date of evaluation: 4/27/2023      Procedure Summary     Date: 04/27/23 Room / Location: [facility] MAIN 06 / [facility] MAIN OR    Anesthesia Start: 6156 Anesthesia Stop: 0831    Procedure: MASS EXCISION OF RIGHT ANKLE (Right) Diagnosis:       Mass of right ankle      (Mass of right ankle [R22.41])    Surgeons: Russ Quiroz MD Responsible Provider: Shelia Cabello MD    Anesthesia Type: General ASA Status: 1          Anesthesia Type: General    Verna Phase I: Verna Score: 10    Verna Phase II: Verna Score: 10      Anesthesia Post Evaluation    Patient location during evaluation: PACU  Patient participation: complete - patient participated  Level of consciousness: awake  Airway patency: patent  Nausea & Vomiting: no nausea  Complications: no  Cardiovascular status: blood pressure returned to baseline  Respiratory status: acceptable  Hydration status: euvolemic

## 2023-04-27 NOTE — ANESTHESIA PRE PROCEDURE
mL  30 mL Other Once MARKOS Andrews - CRNA        ceFAZolin (ANCEF) 2,000 mg in sterile water 20 mL IV syringe  2,000 mg IntraVENous Once Nathalie Days, MD        HYDROcodone-acetaminophen (NORCO) 5-325 MG per tablet 2 tablet  2 tablet Oral Q6H PRN Nathalie Days, MD        ondansetron [facility]) tablet 4 mg  4 mg Oral Q8H PRN Nathalie Days, MD        cephALEXin (KEFLEX) capsule 500 mg  500 mg Oral 3 times per day Nathalie Days, MD           Allergies: Allergies   Allergen Reactions    Latex Hives    Strawberry Anaphylaxis    Aspirin Other (See Comments)     Hyperreaction, pt states it \"over thins\" her blood.  Codeine Nausea And Vomiting       Problem List:  There is no problem list on file for this patient. Past Medical History:        Diagnosis Date    Adult celiac disease        Past Surgical History:        Procedure Laterality Date    ECTOPIC PREGNANCY SURGERY Right     LAPAROSCOPIC TUBAL LIGATION W/ FILCHIE CLIPS Left     clamped per pt    TONSILLECTOMY         Social History:    Social History     Tobacco Use    Smoking status: Never    Smokeless tobacco: Never   Substance Use Topics    Alcohol use: Yes     Comment: occ                                Counseling given: Not Answered      Vital Signs (Current):   Vitals:    04/27/23 0644   BP: (!) 108/53   Pulse: 76   Resp: 14   Temp: 98.1 °F (36.7 °C)   TempSrc: Oral   SpO2: 100%   Weight: 158 lb 12.8 oz (72 kg)                                              BP Readings from Last 3 Encounters:   04/27/23 (!) 108/53       NPO Status: Time of last liquid consumption: 2300                        Time of last solid consumption: 2300                        Date of last liquid consumption: 04/26/23                        Date of last solid food consumption: 04/26/23    BMI:   Wt Readings from Last 3 Encounters:   04/27/23 158 lb 12.8 oz (72 kg)   04/17/23 147 lb (66.7 kg)     Body mass index is 27.26 kg/m².     CBC:   Lab

## 2023-04-27 NOTE — BRIEF OP NOTE
Brief Postoperative Note      Patient: Brandi Ann  YOB: 1985  MRN: [id number]    Date of Procedure: 4/27/2023    Pre-Op Diagnosis Codes:     * Mass of right ankle [R22.41]    Post-Op Diagnosis: Post-Op Diagnosis Codes:     * Mass of right ankle [R22.41]       Procedure(s):  MASS EXCISION OF RIGHT ANKLE    Surgeon(s):  Russ Quiroz MD    Assistant:  * No surgical staff found *    Anesthesia: General and 10 mL of 0.75% Marcaine plain    IV FLUIDS: 700 mL ml  TOURNIQUET TIME: 11 minutes minutes @ 300 mg HG.     Estimated Blood Loss (mL): Minimal    Complications: None    Specimens:   ID Type Source Tests Collected by Time Destination   A : Right Ankle Mass (R/O RA) Tissue Tissue SURGICAL PATHOLOGY Russ Quiroz MD 4/27/2023 0750        Implants:  * No implants in log *      Drains: * No LDAs found *    Findings: Subcutaneous mass tissue subcutaneous tissue overlying the distal fibula    Electronically signed by Russ Quiroz MD on 4/27/2023 at 8:29 AM

## 2023-04-27 NOTE — INTERVAL H&P NOTE
Update History & Physical    The patient's History and Physical of 4/27/2023 6:59 AM         ,  was reviewed with the patient and I examined the patient. There was no change. The surgical site was confirmed by the patient and me. Plan: The risks, benefits, expected outcome, and alternative to the recommended procedure have been discussed with the patient. Patient understands and wants to proceed with the procedure.      Electronically signed by Jorge Nye MD on 4/27/2023 at 6:59 AM

## 2023-04-27 NOTE — OP NOTE
Operative Note        Patient: Stephanie Escobar  YOB: 1985  MRN: [id number]    Date of Procedure: 4/27/2023    Pre-Op Diagnosis Codes:     * Mass of right ankle [R22.41]    Post-Op Diagnosis: Post-Op Diagnosis Codes:     * Mass of right ankle [R22.41]       Procedure(s):  MASS EXCISION OF RIGHT ANKLE    Surgeon(s):  Jalil Garrido MD    Assistant:  * No surgical staff found *    Anesthesia: General and 10 mL of 0.75% Marcaine plain    IV FLUIDS: 700 mL  TOURNIQUET TIME: 11 minutes @ 300 mg HG. Estimated Blood Loss (mL): Minimal    Complications: None    Specimens:   ID Type Source Tests Collected by Time Destination   A : Right Ankle Mass (R/O RA) Tissue Tissue SURGICAL PATHOLOGY Jalil Garrido MD 4/27/2023 0750        Implants:  * No implants in log *      Drains: * No LDAs found *    Findings: Subcutaneous mass tissue subcutaneous tissue overlying the distal fibula    Electronically signed by Jalil Garrido MD on 4/27/2023 at 8:29 AM      OPERATIVE INDICATIONS    The patient is a pleasant 44 y.o. female who has a history of is here for: Persistent pain and firm fullness to the distal portion of her right fibula. Despite conservative care, active modification anti-inflammatories, shoe wear changes, she still having disabling pain to the right lateral hindfoot. She had an MRI done March 24, 2023, of the right ankle without contrast.  The overall findings revealed no discrete ganglion cyst detected. There is a ill-defined subcutaneous signal abnormality overlying the lateral fibula, lateral malleolus which is felt to reflect possible adventitial bursitis or an inflammatory process. Small foci of susceptibility artifact, Blima Coad also present in this region may reflect retained metallic debris\".   There is possible partial-thickness interstitial tear versus volume average artifact of the peroneal longus tendon just distal to the peroneal tubercle (patient is non tender to this region), there is

## 2023-05-02 ENCOUNTER — OFFICE VISIT (OUTPATIENT)
Age: 38
End: 2023-05-02

## 2023-05-02 DIAGNOSIS — Z09 STATUS POST EXCISION OF SKIN LESION, FOLLOW-UP EXAM: Primary | ICD-10-CM

## 2023-05-02 NOTE — PATIENT INSTRUCTIONS
It was nice seeing you today! Consider Mederma skin ointment to aid in scar prevention. Follow up in 2 weeks.

## 2023-05-02 NOTE — PROGRESS NOTES
Patient: Harpal Bryant             MRN: [id number]     SSN: [id number] There is no height or weight on file to calculate BMI. YOB: 1985     AGE: 44 y.o. EX: female    PCP: Betito Murrell MD    ORTHOPAEDIC SURGERY       Mass Excision Of Right Ankle - Right   4/27/2023      IMPRESSION //  DIAGNOSIS AND TREATMENT PLAN      DIAGNOSES  1. Status post excision of skin lesion, follow-up exam        No orders of the defined types were placed in this encounter. Yocasta Lizama established patient presenting for postoperative reassessment. She is 5 days status post above-mentioned surgery. Harpal Bryant has been NWB to the right lower extremity with a knee scooter. Reports no problems with the wound or other issues. Activity, diet and bowels are normal. No pain. No chest pain fever shakes chills night sweats, no calf pain. She has been taking ibuprofen/Tylenol for pain as needed but has 3 young children and is unable to rest much. She feels a popping sensation in her ankle intermittently. Patient denies any fever, chills, chest pain, shortness of breath or calf pain. There are no new illness or injuries to report since last seen in the office. Patient is on Xarelto 10 mg  for DVT prophylaxis. TREATMENT PLAN:    1. RTO in: 2 weeks for suture removal. No X rays needed on next visit  2. DVT Prophylaxis :Chemo Prophylaxis:   mg one po each day DVT Prophylaxis, Encourage Opposite Leg: active ankle DF/PF motion for endogenous fibrinolysis  3. Pain Management:  Ibuprofen, Tylenol   4. Antibiotics: Keflex, surgical prophylaxis. 5. Weight Bearing Status:   NWB w/ knee scooter  6. Keep dressings clean/dry// keep all pets away from dressings/incisions. Patient sent home with dressing for 5 days.    7. Additional Instructions:      PLEASE SEEK IMMEDIATE ASSESSMENT BY ER PHYSICIAN IF ANY OF THE  FOLLOWING EXIST:   Excessive pain, swelling, redness or odor of or around the

## 2023-05-04 ENCOUNTER — TELEPHONE (OUTPATIENT)
Age: 38
End: 2023-05-04

## (undated) DEVICE — ELECTRODE PT RET AD L9FT HI MOIST COND ADH HYDRGEL CORDED

## (undated) DEVICE — BANDAGE COMPR EXSANGUATION SGL LAYERED NO CLSR 9FT LEN 4IN W

## (undated) DEVICE — INTENDED FOR TISSUE SEPARATION, AND OTHER PROCEDURES THAT REQUIRE A SHARP SURGICAL BLADE TO PUNCTURE OR CUT.: Brand: BARD-PARKER SAFETY BLADES SIZE 10, STERILE

## (undated) DEVICE — PADDING,UNDERCAST,COTTON, 3X4YD STERILE: Brand: MEDLINE

## (undated) DEVICE — SOLUTION SURG SCRB 8OZ 4% STRENGTH CHG BTL HIBICLN

## (undated) DEVICE — BANDAGE COBAN 4 IN COMPR W4INXL5YD FOAM COHESIVE QUIK STK SELF ADH SFT

## (undated) DEVICE — PACK PROCEDURE SURG MAJ W/ BASIN LF

## (undated) DEVICE — DRAPE,EXTREMITY,89X128,STERILE: Brand: MEDLINE

## (undated) DEVICE — APPLICATOR MEDICATED 26 CC SOLUTION HI LT ORNG CHLORAPREP

## (undated) DEVICE — PAD,ABDOMINAL,8"X10",ST,LF: Brand: MEDLINE

## (undated) DEVICE — SUTURE NONABSORBABLE MONOFILAMENT 3-0 PS-1 18 IN BLK ETHILON 1663H

## (undated) DEVICE — GAUZE,SPONGE,4"X4",16PLY,STRL,LF,10/TRAY: Brand: MEDLINE

## (undated) DEVICE — BANDAGE,GAUZE,BULKEE LITE,3"X4.1YD,STRL: Brand: MEDLINE

## (undated) DEVICE — SOLUTION IV 1000ML 0.9% SOD CHL

## (undated) DEVICE — 3 ML SYRINGE WITH HYPODERMIC SAFETY NEEDLE: Brand: MAGELLAN

## (undated) DEVICE — WRAP COMPR W3INXL5YD NONSTERILE TAN SELF ADH COBAN

## (undated) DEVICE — SUTURE VCRL SZ 3-0 L27IN ABSRB UD L26MM SH 1/2 CIR J416H

## (undated) DEVICE — STOCKINETTE,IMPERVIOUS,12X48,STERILE: Brand: MEDLINE

## (undated) DEVICE — THREE-QUARTER SHEET: Brand: CONVERTORS

## (undated) DEVICE — KIT CLN UP BON SECOURS MARYV

## (undated) DEVICE — PREMIUM DRY TRAY LF: Brand: MEDLINE INDUSTRIES, INC.

## (undated) DEVICE — DRESSING,GAUZE,XEROFORM,CURAD,1"X8",ST: Brand: CURAD

## (undated) DEVICE — SUTURE MCRYL SZ 3-0 L27IN ABSRB UD L26MM SH 1/2 CIR Y416H